# Patient Record
Sex: MALE | Race: WHITE | NOT HISPANIC OR LATINO | Employment: UNEMPLOYED | URBAN - METROPOLITAN AREA
[De-identification: names, ages, dates, MRNs, and addresses within clinical notes are randomized per-mention and may not be internally consistent; named-entity substitution may affect disease eponyms.]

---

## 2018-05-19 PROBLEM — F32.A DEPRESSION WITH SUICIDAL IDEATION: Status: ACTIVE | Noted: 2018-05-19

## 2018-05-19 PROBLEM — R45.851 DEPRESSION WITH SUICIDAL IDEATION: Status: ACTIVE | Noted: 2018-05-19

## 2018-05-20 PROBLEM — R00.1 BRADYCARDIA: Status: ACTIVE | Noted: 2018-05-20

## 2018-05-20 PROBLEM — F95.2 TOURETTE'S: Status: ACTIVE | Noted: 2018-05-20

## 2021-12-03 ENCOUNTER — HOSPITAL ENCOUNTER (EMERGENCY)
Facility: HOSPITAL | Age: 36
Discharge: HOME | End: 2021-12-03
Attending: FAMILY MEDICINE
Payer: MEDICAID

## 2021-12-03 VITALS
WEIGHT: 168 LBS | HEIGHT: 73 IN | DIASTOLIC BLOOD PRESSURE: 84 MMHG | SYSTOLIC BLOOD PRESSURE: 148 MMHG | HEART RATE: 89 BPM | TEMPERATURE: 99 F | RESPIRATION RATE: 18 BRPM | BODY MASS INDEX: 22.26 KG/M2 | OXYGEN SATURATION: 97 %

## 2021-12-03 DIAGNOSIS — Z76.0 ENCOUNTER FOR MEDICATION REFILL: Primary | ICD-10-CM

## 2021-12-03 PROCEDURE — 99281 EMR DPT VST MAYX REQ PHY/QHP: CPT

## 2021-12-03 RX ORDER — CYPROHEPTADINE HYDROCHLORIDE 4 MG/1
4 TABLET ORAL NIGHTLY
COMMUNITY
End: 2021-12-03 | Stop reason: SDUPTHER

## 2021-12-03 RX ORDER — ARIPIPRAZOLE 5 MG/1
5 TABLET ORAL 2 TIMES DAILY
COMMUNITY
End: 2021-12-03 | Stop reason: SDUPTHER

## 2021-12-03 RX ORDER — OXCARBAZEPINE 300 MG/1
150 TABLET, FILM COATED ORAL 2 TIMES DAILY
COMMUNITY
End: 2021-12-03 | Stop reason: SDUPTHER

## 2021-12-03 RX ORDER — PIMOZIDE 1 MG/1
1 TABLET ORAL NIGHTLY
Qty: 30 TABLET | Refills: 0 | Status: SHIPPED | OUTPATIENT
Start: 2021-12-03

## 2021-12-03 RX ORDER — OXCARBAZEPINE 300 MG/1
150 TABLET, FILM COATED ORAL 2 TIMES DAILY
Qty: 30 TABLET | Refills: 0 | Status: SHIPPED | OUTPATIENT
Start: 2021-12-03 | End: 2023-02-25

## 2021-12-03 RX ORDER — ARIPIPRAZOLE 5 MG/1
5 TABLET ORAL 2 TIMES DAILY
Qty: 60 TABLET | Refills: 0 | Status: SHIPPED | OUTPATIENT
Start: 2021-12-03 | End: 2023-02-25

## 2021-12-03 RX ORDER — PIMOZIDE 1 MG/1
1 TABLET ORAL NIGHTLY
COMMUNITY
End: 2021-12-03 | Stop reason: SDUPTHER

## 2021-12-03 RX ORDER — CYPROHEPTADINE HYDROCHLORIDE 4 MG/1
4 TABLET ORAL NIGHTLY
Qty: 30 TABLET | Refills: 0 | Status: SHIPPED | OUTPATIENT
Start: 2021-12-03 | End: 2022-01-02

## 2022-01-03 ENCOUNTER — LAB VISIT (OUTPATIENT)
Dept: PRIMARY CARE CLINIC | Facility: OTHER | Age: 37
End: 2022-01-03
Attending: INTERNAL MEDICINE
Payer: OTHER GOVERNMENT

## 2022-01-03 DIAGNOSIS — Z20.822 ENCOUNTER FOR LABORATORY TESTING FOR COVID-19 VIRUS: ICD-10-CM

## 2022-01-03 PROCEDURE — U0003 INFECTIOUS AGENT DETECTION BY NUCLEIC ACID (DNA OR RNA); SEVERE ACUTE RESPIRATORY SYNDROME CORONAVIRUS 2 (SARS-COV-2) (CORONAVIRUS DISEASE [COVID-19]), AMPLIFIED PROBE TECHNIQUE, MAKING USE OF HIGH THROUGHPUT TECHNOLOGIES AS DESCRIBED BY CMS-2020-01-R: HCPCS | Performed by: INTERNAL MEDICINE

## 2022-01-07 LAB — SARS-COV-2 RNA RESP QL NAA+PROBE: NOT DETECTED

## 2023-02-25 ENCOUNTER — HOSPITAL ENCOUNTER (EMERGENCY)
Facility: HOSPITAL | Age: 38
Discharge: HOME | End: 2023-02-25
Attending: EMERGENCY MEDICINE
Payer: MEDICAID

## 2023-02-25 VITALS
HEART RATE: 85 BPM | OXYGEN SATURATION: 99 % | BODY MASS INDEX: 24.52 KG/M2 | DIASTOLIC BLOOD PRESSURE: 85 MMHG | TEMPERATURE: 100 F | RESPIRATION RATE: 19 BRPM | HEIGHT: 73 IN | SYSTOLIC BLOOD PRESSURE: 139 MMHG | WEIGHT: 185 LBS

## 2023-02-25 DIAGNOSIS — R00.2 PALPITATIONS: Primary | ICD-10-CM

## 2023-02-25 DIAGNOSIS — R00.2 PALPITATION: ICD-10-CM

## 2023-02-25 PROCEDURE — 99283 EMERGENCY DEPT VISIT LOW MDM: CPT

## 2023-02-25 PROCEDURE — 93005 ELECTROCARDIOGRAM TRACING: CPT

## 2023-02-25 PROCEDURE — 63600175 PHARM REV CODE 636 W HCPCS: Performed by: EMERGENCY MEDICINE

## 2023-02-25 PROCEDURE — 96372 THER/PROPH/DIAG INJ SC/IM: CPT | Performed by: EMERGENCY MEDICINE

## 2023-02-25 RX ORDER — DIAZEPAM 2 MG/1
2 TABLET ORAL
Status: DISCONTINUED | OUTPATIENT
Start: 2023-02-25 | End: 2023-02-25

## 2023-02-25 RX ORDER — ZIPRASIDONE MESYLATE 20 MG/ML
10 INJECTION, POWDER, LYOPHILIZED, FOR SOLUTION INTRAMUSCULAR
Status: DISCONTINUED | OUTPATIENT
Start: 2023-02-25 | End: 2023-02-25 | Stop reason: HOSPADM

## 2023-02-25 RX ORDER — HYDROXYZINE PAMOATE 25 MG/1
25 CAPSULE ORAL EVERY 6 HOURS PRN
Qty: 16 CAPSULE | Refills: 0 | Status: SHIPPED | OUTPATIENT
Start: 2023-02-25

## 2023-02-25 RX ORDER — WATER FOR INJECTION,STERILE
VIAL (ML) INJECTION
Status: DISCONTINUED
Start: 2023-02-25 | End: 2023-02-25 | Stop reason: HOSPADM
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
## 2024-09-03 ENCOUNTER — HOSPITAL ENCOUNTER (EMERGENCY)
Facility: HOSPITAL | Age: 39
Discharge: HOME | End: 2024-09-03
Attending: STUDENT IN AN ORGANIZED HEALTH CARE EDUCATION/TRAINING PROGRAM
Payer: MEDICAID

## 2024-09-03 VITALS
DIASTOLIC BLOOD PRESSURE: 89 MMHG | HEART RATE: 122 BPM | WEIGHT: 145 LBS | TEMPERATURE: 98 F | SYSTOLIC BLOOD PRESSURE: 130 MMHG | BODY MASS INDEX: 18.61 KG/M2 | OXYGEN SATURATION: 96 % | RESPIRATION RATE: 18 BRPM | HEIGHT: 74 IN

## 2024-09-03 DIAGNOSIS — F10.920 ALCOHOLIC INTOXICATION WITHOUT COMPLICATION: Primary | ICD-10-CM

## 2024-09-03 PROCEDURE — 99281 EMR DPT VST MAYX REQ PHY/QHP: CPT
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
## 2024-09-04 NOTE — ED NOTES
Assumed care of pt from aron welch. Pt denies any si/hi. In no distress. Pt will be d/c'd and told him to call for a ride. Has no family or friends and called [facility] for a ride home. Spoke to officer karey who will call his sergeant and see if they will allow for a ride home

## 2025-01-04 ENCOUNTER — HOSPITAL ENCOUNTER (EMERGENCY)
Facility: HOSPITAL | Age: 40
Discharge: HOME | End: 2025-01-04
Attending: STUDENT IN AN ORGANIZED HEALTH CARE EDUCATION/TRAINING PROGRAM
Payer: MEDICARE

## 2025-01-04 VITALS
HEIGHT: 68 IN | TEMPERATURE: 100 F | WEIGHT: 140 LBS | HEART RATE: 112 BPM | SYSTOLIC BLOOD PRESSURE: 132 MMHG | BODY MASS INDEX: 21.22 KG/M2 | OXYGEN SATURATION: 95 % | DIASTOLIC BLOOD PRESSURE: 82 MMHG | RESPIRATION RATE: 20 BRPM

## 2025-01-04 DIAGNOSIS — Z00.8 MEDICAL CLEARANCE FOR INCARCERATION: Primary | ICD-10-CM

## 2025-01-04 PROCEDURE — 99281 EMR DPT VST MAYX REQ PHY/QHP: CPT

## 2025-01-04 RX ORDER — AMLODIPINE BESYLATE 5 MG/1
TABLET ORAL
COMMUNITY
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
## 2025-05-03 ENCOUNTER — HOSPITAL ENCOUNTER (EMERGENCY)
Facility: HOSPITAL | Age: 40
Discharge: HOME | End: 2025-05-03
Attending: STUDENT IN AN ORGANIZED HEALTH CARE EDUCATION/TRAINING PROGRAM
Payer: MEDICARE

## 2025-05-03 VITALS
WEIGHT: 148 LBS | HEART RATE: 86 BPM | DIASTOLIC BLOOD PRESSURE: 85 MMHG | OXYGEN SATURATION: 99 % | RESPIRATION RATE: 20 BRPM | HEIGHT: 68 IN | BODY MASS INDEX: 22.43 KG/M2 | TEMPERATURE: 99 F | SYSTOLIC BLOOD PRESSURE: 124 MMHG

## 2025-05-03 DIAGNOSIS — Z13.9 ENCOUNTER FOR MEDICAL SCREENING EXAMINATION: Primary | ICD-10-CM

## 2025-05-03 PROCEDURE — 99282 EMERGENCY DEPT VISIT SF MDM: CPT

## 2025-05-03 RX ORDER — LOSARTAN POTASSIUM 50 MG/1
50 TABLET ORAL
COMMUNITY
Start: 2025-02-20

## 2025-05-04 NOTE — ED NOTES
Pt reports denies si and hi and he is here volentary wants to go to [place] because he is from there.

## 2025-05-04 NOTE — ED PROVIDER NOTES
"Encounter Date: 5/3/2025       History     Chief Complaint   Patient presents with    MEDICAL EVALUATION     Pt arrived to ED wanting to be evaluated by a doctor would and not state specific reason except that he wanted to go to [place]. Denies SI and HI. Denies pain. Admitted to drinking beer and whiskey today. AAOx4. See Dr. Camp's note.     Patient is a 39-year-old white gentleman with a history of Tourette syndrome, TBI self-reported who presented to the ER today voluntarily to "go to [place]. "And states he did smoke marijuana, smokes cigarettes and drank beer today.  It appears per chart review patient often times comes in after drinking alcohol.  When asked why he wants to go back to [place] he states that "I was born there. " he states he lives in [place] now.  Initially was presumed that patient was coming in for psych evaluation however patient states "I am not suicidal, homicidal or hearing any voices."He denies any complaints.  He states he is hungry.  He denies any fevers, chest pain, shortness of breath or abdominal pain.      Review of patient's allergies indicates:   Allergen Reactions    Pcn [penicillins]      Past Medical History:   Diagnosis Date    Depression     Mild intellectual disability     Tourette's     Tourettes syndrome      History reviewed. No pertinent surgical history.  No family history on file.  Social History[1]  Review of Systems   Constitutional:  Negative for chills, fatigue and fever.   HENT:  Negative for congestion, sore throat and trouble swallowing.    Eyes:  Negative for pain and visual disturbance.   Respiratory:  Negative for cough, shortness of breath and wheezing.    Cardiovascular:  Negative for chest pain and palpitations.   Gastrointestinal:  Negative for abdominal pain, blood in stool, constipation, diarrhea, nausea and vomiting.   Genitourinary:  Negative for dysuria and hematuria.   Musculoskeletal:  Negative for back pain and myalgias.   Skin:  " Negative for rash and wound.   Neurological:  Negative for seizures, syncope and headaches.   Psychiatric/Behavioral:  Negative for confusion. The patient is not nervous/anxious.        Physical Exam     Initial Vitals [05/03/25 2045]   BP Pulse Resp Temp SpO2   (!) 128/92 82 18 98.5 °F (36.9 °C) 99 %      MAP       --         Physical Exam    Nursing note and vitals reviewed.  Constitutional: He appears well-developed and well-nourished. No distress.   HENT:   Head: Normocephalic and atraumatic.   Eyes: Conjunctivae and EOM are normal. Right eye exhibits no discharge. Left eye exhibits no discharge. No scleral icterus.   Neck: No tracheal deviation present.   Normal range of motion.  Cardiovascular:  Normal rate, regular rhythm and normal heart sounds.     Exam reveals no gallop and no friction rub.       No murmur heard.  Pulmonary/Chest: Breath sounds normal. No respiratory distress. He has no wheezes. He has no rhonchi. He has no rales.   Abdominal: Abdomen is soft. He exhibits no distension. There is no abdominal tenderness. There is no rebound and no guarding.   Musculoskeletal:         General: No edema. Normal range of motion.      Cervical back: Normal range of motion.     Neurological: He is alert.   Skin: Skin is warm and dry. No rash and no abscess noted. No erythema. No pallor.   Psychiatric: He has a normal mood and affect. His speech is normal and behavior is normal. Judgment and thought content normal. He is not actively hallucinating. Thought content is not paranoid. He expresses no homicidal and no suicidal ideation. He is attentive.         ED Course   Procedures  Labs Reviewed - No data to display       Imaging Results    None          Medications - No data to display  Medical Decision Making  Differentials:  Psych evaluation, malingering, secondary gain, suicidal ideation   History is provided by the patient   39-year-old well-appearing male presents to the ER today for vague complaints. It  seems his main complaint is that he is hungry.  He denies all criteria for PEC.  Pt was provided with a meal and showed no signs of being clinically intoxicated.  Pt monitored for 3+ hours and again denies any complaints for PEC.  ER return precautions discussed, close follow up with the PCP was recommended.                                      Clinical Impression:  Final diagnoses:  [Z13.9] Encounter for medical screening examination (Primary)          ED Disposition Condition    Discharge Stable          ED Prescriptions    None       Follow-up Information       Follow up With Specialties Details Why Contact Info    [facility] Emergency [facility]  If symptoms worsen [address] [place] [id number]  [phone]                 [1]   Social History  Tobacco Use    Smoking status: Every Day     Current packs/day: 2.00     Types: Cigarettes    Smokeless tobacco: Never   Substance Use Topics    Alcohol use: Yes     Comment: daily    Drug use: Yes     Types: Marijuana     Comment: Yuri Sharma MD  05/03/25 8952

## 2025-08-02 ENCOUNTER — HOSPITAL ENCOUNTER (EMERGENCY)
Facility: HOSPITAL | Age: 40
Discharge: PSYCHIATRIC OR SUBSTANCE USE FACILITY | End: 2025-08-02
Attending: GENERAL PRACTICE
Payer: MEDICARE

## 2025-08-02 ENCOUNTER — HOSPITAL ENCOUNTER (INPATIENT)
Facility: HOSPITAL | Age: 40
LOS: 9 days | Discharge: HOME | DRG: 881 | End: 2025-08-11
Attending: PSYCHIATRY & NEUROLOGY | Admitting: PSYCHIATRY & NEUROLOGY
Payer: MEDICARE

## 2025-08-02 VITALS
TEMPERATURE: 97 F | WEIGHT: 145 LBS | RESPIRATION RATE: 17 BRPM | BODY MASS INDEX: 19.22 KG/M2 | DIASTOLIC BLOOD PRESSURE: 64 MMHG | OXYGEN SATURATION: 97 % | SYSTOLIC BLOOD PRESSURE: 113 MMHG | HEART RATE: 80 BPM | HEIGHT: 73 IN

## 2025-08-02 DIAGNOSIS — F32.A DEPRESSION WITH SUICIDAL IDEATION: ICD-10-CM

## 2025-08-02 DIAGNOSIS — F10.10 ALCOHOL ABUSE: ICD-10-CM

## 2025-08-02 DIAGNOSIS — R45.851 SUICIDAL IDEATION: Primary | ICD-10-CM

## 2025-08-02 DIAGNOSIS — R45.851 DEPRESSION WITH SUICIDAL IDEATION: ICD-10-CM

## 2025-08-02 LAB
ACCEPTIBLE SP GR UR QL: >=1.03 (ref 1–1.03)
ALBUMIN SERPL-MCNC: 4.1 G/DL (ref 3.5–5)
ALBUMIN/GLOB SERPL: 1.3 RATIO (ref 1.1–2)
ALP SERPL-CCNC: 62 UNIT/L (ref 40–150)
ALT SERPL-CCNC: 15 UNIT/L (ref 0–55)
AMPHET UR QL SCN: NEGATIVE
ANION GAP SERPL CALC-SCNC: 14 MEQ/L
APAP SERPL-MCNC: <3 UG/ML (ref 10–30)
AST SERPL-CCNC: 21 UNIT/L (ref 11–45)
BACTERIA #/AREA URNS AUTO: ABNORMAL /HPF
BARBITURATE SCN PRESENT UR: NEGATIVE
BASOPHILS # BLD AUTO: 0.08 X10(3)/MCL
BASOPHILS NFR BLD AUTO: 1 %
BENZODIAZ UR QL SCN: NEGATIVE
BILIRUB SERPL-MCNC: 0.7 MG/DL
BILIRUB UR QL STRIP.AUTO: ABNORMAL
BUN SERPL-MCNC: 9 MG/DL (ref 8.9–20.6)
CALCIUM SERPL-MCNC: 8.5 MG/DL (ref 8.4–10.2)
CANNABINOIDS UR QL SCN: NEGATIVE
CHLORIDE SERPL-SCNC: 108 MMOL/L (ref 98–107)
CLARITY UR: CLEAR
CO2 SERPL-SCNC: 19 MMOL/L (ref 22–29)
COCAINE UR QL SCN: NEGATIVE
COLOR UR AUTO: YELLOW
CREAT SERPL-MCNC: 0.77 MG/DL (ref 0.72–1.25)
CREAT/UREA NIT SERPL: 12
EOSINOPHIL # BLD AUTO: 0.13 X10(3)/MCL (ref 0–0.9)
EOSINOPHIL NFR BLD AUTO: 1.5 %
ERYTHROCYTE [DISTWIDTH] IN BLOOD BY AUTOMATED COUNT: 13.2 % (ref 11.5–17)
ETHANOL SERPL-MCNC: 209 MG/DL
FENTANYL UR QL SCN: NEGATIVE
GFR SERPLBLD CREATININE-BSD FMLA CKD-EPI: >60 ML/MIN/1.73/M2
GLOBULIN SER-MCNC: 3.1 GM/DL (ref 2.4–3.5)
GLUCOSE SERPL-MCNC: 81 MG/DL (ref 74–100)
GLUCOSE UR QL STRIP: NEGATIVE
GRAN CASTS URNS QL MICRO: ABNORMAL /LPF
HCT VFR BLD AUTO: 46.1 % (ref 42–52)
HGB BLD-MCNC: 16.2 G/DL (ref 14–18)
HGB UR QL STRIP: ABNORMAL
IMM GRANULOCYTES # BLD AUTO: 0.02 X10(3)/MCL (ref 0–0.04)
IMM GRANULOCYTES NFR BLD AUTO: 0.2 %
KETONES UR QL STRIP: ABNORMAL
LEUKOCYTE ESTERASE UR QL STRIP: NEGATIVE
LYMPHOCYTES # BLD AUTO: 2.48 X10(3)/MCL (ref 0.6–4.6)
LYMPHOCYTES NFR BLD AUTO: 29.5 %
MCH RBC QN AUTO: 30.9 PG (ref 27–31)
MCHC RBC AUTO-ENTMCNC: 35.1 G/DL (ref 33–36)
MCV RBC AUTO: 87.8 FL (ref 80–94)
MDMA UR QL SCN: NEGATIVE
MONOCYTES # BLD AUTO: 0.56 X10(3)/MCL (ref 0.1–1.3)
MONOCYTES NFR BLD AUTO: 6.7 %
MUCOUS THREADS URNS QL MICRO: ABNORMAL /LPF
NEUTROPHILS # BLD AUTO: 5.15 X10(3)/MCL (ref 2.1–9.2)
NEUTROPHILS NFR BLD AUTO: 61.1 %
NITRITE UR QL STRIP: NEGATIVE
NRBC BLD AUTO-RTO: 0 %
OPIATES UR QL SCN: NEGATIVE
PCP UR QL: NEGATIVE
PH UR STRIP: 6 [PH]
PH UR: 6 [PH] (ref 3–11)
PLATELET # BLD AUTO: 252 X10(3)/MCL (ref 130–400)
PMV BLD AUTO: 9.6 FL (ref 7.4–10.4)
POTASSIUM SERPL-SCNC: 4.1 MMOL/L (ref 3.5–5.1)
PROT SERPL-MCNC: 7.2 GM/DL (ref 6.4–8.3)
PROT UR QL STRIP: ABNORMAL
RBC # BLD AUTO: 5.25 X10(6)/MCL (ref 4.7–6.1)
RBC #/AREA URNS AUTO: ABNORMAL /HPF
SALICYLATES SERPL-MCNC: <5 MG/DL (ref 15–30)
SODIUM SERPL-SCNC: 141 MMOL/L (ref 136–145)
SP GR UR STRIP.AUTO: >=1.03 (ref 1–1.03)
SQUAMOUS #/AREA URNS AUTO: ABNORMAL /HPF
TSH SERPL-ACNC: 0.36 UIU/ML (ref 0.35–4.94)
UROBILINOGEN UR STRIP-ACNC: 0.2
WBC # BLD AUTO: 8.42 X10(3)/MCL (ref 4.5–11.5)
WBC #/AREA URNS AUTO: ABNORMAL /HPF

## 2025-08-02 PROCEDURE — 80053 COMPREHEN METABOLIC PANEL: CPT | Performed by: GENERAL PRACTICE

## 2025-08-02 PROCEDURE — 81003 URINALYSIS AUTO W/O SCOPE: CPT | Performed by: GENERAL PRACTICE

## 2025-08-02 PROCEDURE — 80179 DRUG ASSAY SALICYLATE: CPT | Performed by: GENERAL PRACTICE

## 2025-08-02 PROCEDURE — 11400000 HC PSYCH PRIVATE ROOM

## 2025-08-02 PROCEDURE — 80143 DRUG ASSAY ACETAMINOPHEN: CPT | Performed by: GENERAL PRACTICE

## 2025-08-02 PROCEDURE — 25000003 PHARM REV CODE 250: Performed by: GENERAL PRACTICE

## 2025-08-02 PROCEDURE — 25000003 PHARM REV CODE 250: Performed by: PSYCHIATRY & NEUROLOGY

## 2025-08-02 PROCEDURE — 82077 ASSAY SPEC XCP UR&BREATH IA: CPT | Performed by: GENERAL PRACTICE

## 2025-08-02 PROCEDURE — 84443 ASSAY THYROID STIM HORMONE: CPT | Performed by: GENERAL PRACTICE

## 2025-08-02 PROCEDURE — 99285 EMERGENCY DEPT VISIT HI MDM: CPT

## 2025-08-02 PROCEDURE — 80307 DRUG TEST PRSMV CHEM ANLYZR: CPT | Performed by: GENERAL PRACTICE

## 2025-08-02 PROCEDURE — 85025 COMPLETE CBC W/AUTO DIFF WBC: CPT | Performed by: GENERAL PRACTICE

## 2025-08-02 RX ORDER — DIPHENHYDRAMINE HCL 25 MG
50 CAPSULE ORAL EVERY 4 HOURS PRN
Status: DISCONTINUED | OUTPATIENT
Start: 2025-08-02 | End: 2025-08-02 | Stop reason: HOSPADM

## 2025-08-02 RX ORDER — OLANZAPINE 10 MG/1
10 TABLET, ORALLY DISINTEGRATING ORAL EVERY 8 HOURS PRN
Status: DISCONTINUED | OUTPATIENT
Start: 2025-08-02 | End: 2025-08-11

## 2025-08-02 RX ORDER — IBUPROFEN 400 MG/1
400 TABLET, FILM COATED ORAL EVERY 6 HOURS PRN
Status: DISCONTINUED | OUTPATIENT
Start: 2025-08-02 | End: 2025-08-11 | Stop reason: HOSPADM

## 2025-08-02 RX ORDER — LORAZEPAM 1 MG/1
1 TABLET ORAL 4 TIMES DAILY
Status: DISCONTINUED | OUTPATIENT
Start: 2025-08-02 | End: 2025-08-05

## 2025-08-02 RX ORDER — OLANZAPINE 5 MG/1
10 TABLET, ORALLY DISINTEGRATING ORAL EVERY 8 HOURS PRN
Status: DISCONTINUED | OUTPATIENT
Start: 2025-08-02 | End: 2025-08-02 | Stop reason: HOSPADM

## 2025-08-02 RX ORDER — ACETAMINOPHEN 325 MG/1
650 TABLET ORAL EVERY 6 HOURS PRN
Status: DISCONTINUED | OUTPATIENT
Start: 2025-08-02 | End: 2025-08-04

## 2025-08-02 RX ORDER — ALUMINUM HYDROXIDE, MAGNESIUM HYDROXIDE, AND SIMETHICONE 1200; 120; 1200 MG/30ML; MG/30ML; MG/30ML
30 SUSPENSION ORAL EVERY 6 HOURS PRN
Status: DISCONTINUED | OUTPATIENT
Start: 2025-08-02 | End: 2025-08-11 | Stop reason: HOSPADM

## 2025-08-02 RX ORDER — HYDROXYZINE PAMOATE 25 MG/1
50 CAPSULE ORAL EVERY 6 HOURS PRN
Status: DISCONTINUED | OUTPATIENT
Start: 2025-08-02 | End: 2025-08-11

## 2025-08-02 RX ADMIN — DIPHENHYDRAMINE HYDROCHLORIDE 50 MG: 25 CAPSULE ORAL at 12:08

## 2025-08-02 RX ADMIN — LORAZEPAM 1 MG: 1 TABLET ORAL at 04:08

## 2025-08-02 RX ADMIN — LORAZEPAM 1 MG: 1 TABLET ORAL at 08:08

## 2025-08-02 RX ADMIN — OLANZAPINE 10 MG: 5 TABLET, ORALLY DISINTEGRATING ORAL at 12:08

## 2025-08-02 NOTE — ED PROVIDER NOTES
Encounter Date: 8/2/2025       History     Chief Complaint   Patient presents with    Psychiatric Evaluation     Patient brought into the ER by police who responded to call from pt's neighbor that he was outside yelling.  Patient told the police he wanted them to shoot him.  Pt states that he did say that, but does not want to die or harm others.  Patient manic with tangential speaking.       HPI  Review of patient's allergies indicates:   Allergen Reactions    Pcn [penicillins]      Past Medical History:   Diagnosis Date    Depression     Mild intellectual disability     Tourette's     Tourettes syndrome      History reviewed. No pertinent surgical history.  No family history on file.  Social History[1]  Review of Systems    Physical Exam     Initial Vitals [08/02/25 1223]   BP Pulse Resp Temp SpO2   -- (!) 119 20 97.9 °F (36.6 °C) 97 %      MAP       --         Physical Exam    Nursing note and vitals reviewed.  Constitutional: He appears well-developed and well-nourished.   HENT:   Head: Normocephalic and atraumatic.   Eyes: EOM are normal. Pupils are equal, round, and reactive to light.   Neck: Neck supple.   Normal range of motion.  Cardiovascular:  Normal rate, regular rhythm, normal heart sounds and intact distal pulses.           Pulmonary/Chest: Breath sounds normal.   Abdominal: Abdomen is soft. Bowel sounds are normal.   Musculoskeletal:         General: Normal range of motion.      Cervical back: Normal range of motion and neck supple.     Neurological: He is alert and oriented to person, place, and time. He has normal strength.   Skin: Skin is warm and dry.   Psychiatric: His mood appears anxious. His affect is angry and inappropriate. His speech is rapid and/or pressured and tangential. He is agitated, aggressive and hyperactive. Thought content is paranoid. He expresses impulsivity and inappropriate judgment. He expresses suicidal ideation. He expresses suicidal plans. He exhibits abnormal recent  memory.         ED Course   Procedures  Labs Reviewed   COMPREHENSIVE METABOLIC PANEL - Abnormal       Result Value    Sodium 141      Potassium 4.1      Chloride 108 (*)     CO2 19 (*)     Glucose 81      Blood Urea Nitrogen 9.0      Creatinine 0.77      Calcium 8.5      Protein Total 7.2      Albumin 4.1      Globulin 3.1      Albumin/Globulin Ratio 1.3      Bilirubin Total 0.7      ALP 62      ALT 15      AST 21      eGFR >60      Anion Gap 14.0      BUN/Creatinine Ratio 12     URINALYSIS, REFLEX TO URINE CULTURE - Abnormal    Color, UA Yellow      Appearance, UA Clear      Specific Gravity, UA >=1.030      pH, UA 6.0      Protein, UA 1+ (*)     Glucose, UA Negative      Ketones, UA Trace (*)     Blood, UA 1+ (*)     Bilirubin, UA 1+ (*)     Urobilinogen, UA 0.2      Nitrites, UA Negative      Leukocyte Esterase, UA Negative     ALCOHOL,MEDICAL (ETHANOL) - Abnormal    Ethanol Level 209.0 (*)    ACETAMINOPHEN LEVEL - Abnormal    Acetaminophen Level <3.0 (*)    SALICYLATE LEVEL - Abnormal    Salicylate Level <5.0 (*)    URINALYSIS, MICROSCOPIC - Abnormal    Bacteria, UA Few (*)     Mucous, UA Moderate (*)     Granular Casts, UA Few (*)     RBC, UA 0-2      WBC, UA 0-2      Squamous Epithelial Cells, UA Rare     DRUG SCREEN, URINE (BEAKER) - Normal    Amphetamines, Urine Negative      Barbiturates, Urine Negative      Benzodiazepine, Urine Negative      Cannabinoids, Urine Negative      Cocaine, Urine Negative      Fentanyl, Urine Negative      MDMA, Urine Negative      Opiates, Urine Negative      Phencyclidine, Urine Negative      pH, Urine 6.0      Specific Gravity, Urine Auto >=1.030      Narrative:     Cut off concentrations:    Amphetamines - 1000 ng/ml  Barbiturates - 200 ng/ml  Benzodiazepine - 200 ng/ml  Cannabinoids (THC) - 50 ng/ml  Cocaine - 300 ng/ml  Fentanyl - 1.0 ng/ml  MDMA - 500 ng/ml  Opiates - 300 ng/ml   Phencyclidine (PCP) - 25 ng/ml    Specimen submitted for drug analysis and tested for pH and  specific gravity in order to evaluate sample integrity. Suspect tampering if specific gravity is <1.003 and/or pH is not within the range of 4.5 - 8.0  False negatives may result form substances such as bleach added to urine.  False positives may result for the presence of a substance with similar chemical structure to the drug or its metabolite.    This test provides only a PRELIMINARY analytical test result. A more specific alternate chemical method must be used in order to obtain a confirmed analytical result. Gas chromatography/mass spectrometry (GC/MS) is the preferred confirmatory method. Other chemical confirmation methods are available. Clinical consideration and professional judgement should be applied to any drug of abuse test result, particularly when preliminary positive results are used.    Positive results will be confirmed only at the physicians request. Unconfirmed screening results are to be used only for medical purposes (treatment).        TSH - Normal    TSH 0.359     CBC W/ AUTO DIFFERENTIAL    Narrative:     The following orders were created for panel order CBC auto differential.  Procedure                               Abnormality         Status                     ---------                               -----------         ------                     CBC with Differential[[id number]]                            Final result                 Please view results for these tests on the individual orders.   CBC WITH DIFFERENTIAL    WBC 8.42      RBC 5.25      Hgb 16.2      Hct 46.1      MCV 87.8      MCH 30.9      MCHC 35.1      RDW 13.2      Platelet 252      MPV 9.6      Neut % 61.1      Lymph % 29.5      Mono % 6.7      Eos % 1.5      Basophil % 1.0      Imm Grans % 0.2      Neut # 5.15      Lymph # 2.48      Mono # 0.56      Eos # 0.13      Baso # 0.08      Imm Gran # 0.02      NRBC% 0.0            Imaging Results    None          Medications   OLANZapine zydis disintegrating tablet 10 mg (10 mg  Oral Given 8/2/25 1230)   diphenhydrAMINE capsule 50 mg (50 mg Oral Given 8/2/25 1230)     Medical Decision Making  Labs revealed that the patient has an elevated alcohol.  He is currently resting comfortably and we will be PEC'd due to manic behavior that is unpredictable and possibly dangerous.  While he denies suicidal ideations at the present, he did ask the  to shoot him indicating there might be suicidal potential.  As such we will PCM and have him evaluated the psychiatric facility.    Amount and/or Complexity of Data Reviewed  Labs: ordered.    Risk  OTC drugs.  Prescription drug management.                  Medically cleared for psychiatry placement: 8/2/2025  1:41 PM                       Clinical Impression:  Final diagnoses:  [R45.851] Suicidal ideation (Primary)  [F10.10] Alcohol abuse          ED Disposition Condition    Transfer to Psych Facility Stable          ED Prescriptions    None       Follow-up Information    None                [1]   Social History  Tobacco Use    Smoking status: Every Day     Current packs/day: 2.00     Types: Cigarettes, Cigars    Smokeless tobacco: Never   Substance Use Topics    Alcohol use: Yes     Comment: daily    Drug use: Not Currently     Types: Marijuana     Comment: Lawson Vargas MD  08/02/25 8222

## 2025-08-02 NOTE — ED NOTES
Pt PEC'd at 1218 by Dr Magallanes, security notified; initial safety checks performed with [facility]; Pt belongings obtained; shirt, pants, belt, hat, slider shoes, $2, pack of cigarettes secured; pt changed into facility appropriate scrubs

## 2025-08-02 NOTE — ED NOTES
Report to [facility]; security notified to escort pt to [facility] w/ RN via WC

## 2025-08-03 PROBLEM — R45.851 SUICIDAL IDEATION: Status: RESOLVED | Noted: 2025-08-03 | Resolved: 2025-08-03

## 2025-08-03 PROBLEM — R45.851 SUICIDAL IDEATION: Status: ACTIVE | Noted: 2025-08-03

## 2025-08-03 LAB
CHOLEST SERPL-MCNC: 121 MG/DL
CHOLEST/HDLC SERPL: 3 {RATIO} (ref 0–5)
EST. AVERAGE GLUCOSE BLD GHB EST-MCNC: 93.9 MG/DL
GLUCOSE P FAST SERPL-MCNC: 93 MG/DL (ref 70–100)
HBA1C MFR BLD: 4.9 %
HDLC SERPL-MCNC: 45 MG/DL (ref 35–60)
LDLC SERPL CALC-MCNC: 68 MG/DL (ref 50–140)
T PALLIDUM AB SER QL: NONREACTIVE
T3FREE SERPL-MCNC: 2.14 PG/ML (ref 1.58–3.91)
T4 FREE SERPL-MCNC: 0.86 NG/DL (ref 0.7–1.48)
TRIGL SERPL-MCNC: 41 MG/DL (ref 34–140)
TSH SERPL-ACNC: 0.4 UIU/ML (ref 0.35–4.94)
VLDLC SERPL CALC-MCNC: 8 MG/DL

## 2025-08-03 PROCEDURE — 80061 LIPID PANEL: CPT | Performed by: PSYCHIATRY & NEUROLOGY

## 2025-08-03 PROCEDURE — 84443 ASSAY THYROID STIM HORMONE: CPT | Performed by: PSYCHIATRY & NEUROLOGY

## 2025-08-03 PROCEDURE — 25000003 PHARM REV CODE 250: Performed by: PSYCHIATRY & NEUROLOGY

## 2025-08-03 PROCEDURE — 11400000 HC PSYCH PRIVATE ROOM

## 2025-08-03 PROCEDURE — 86780 TREPONEMA PALLIDUM: CPT | Performed by: PSYCHIATRY & NEUROLOGY

## 2025-08-03 PROCEDURE — 84481 FREE ASSAY (FT-3): CPT | Performed by: PSYCHIATRY & NEUROLOGY

## 2025-08-03 PROCEDURE — 84439 ASSAY OF FREE THYROXINE: CPT | Performed by: PSYCHIATRY & NEUROLOGY

## 2025-08-03 PROCEDURE — 83036 HEMOGLOBIN GLYCOSYLATED A1C: CPT | Performed by: PSYCHIATRY & NEUROLOGY

## 2025-08-03 PROCEDURE — 82947 ASSAY GLUCOSE BLOOD QUANT: CPT | Performed by: PSYCHIATRY & NEUROLOGY

## 2025-08-03 PROCEDURE — 36415 COLL VENOUS BLD VENIPUNCTURE: CPT | Performed by: PSYCHIATRY & NEUROLOGY

## 2025-08-03 RX ORDER — SERTRALINE HYDROCHLORIDE 50 MG/1
50 TABLET, FILM COATED ORAL DAILY
Status: DISCONTINUED | OUTPATIENT
Start: 2025-08-03 | End: 2025-08-11 | Stop reason: HOSPADM

## 2025-08-03 RX ORDER — OLANZAPINE 5 MG/1
5 TABLET, FILM COATED ORAL NIGHTLY
Status: DISCONTINUED | OUTPATIENT
Start: 2025-08-03 | End: 2025-08-07

## 2025-08-03 RX ADMIN — OLANZAPINE 5 MG: 5 TABLET, FILM COATED ORAL at 08:08

## 2025-08-03 RX ADMIN — LORAZEPAM 1 MG: 1 TABLET ORAL at 04:08

## 2025-08-03 RX ADMIN — LORAZEPAM 1 MG: 1 TABLET ORAL at 12:08

## 2025-08-03 RX ADMIN — LORAZEPAM 1 MG: 1 TABLET ORAL at 08:08

## 2025-08-03 RX ADMIN — SERTRALINE HYDROCHLORIDE 50 MG: 50 TABLET ORAL at 10:08

## 2025-08-04 PROBLEM — F10.90 ALCOHOL USE DISORDER: Status: ACTIVE | Noted: 2025-08-04

## 2025-08-04 PROCEDURE — 25000003 PHARM REV CODE 250: Performed by: PSYCHIATRY & NEUROLOGY

## 2025-08-04 PROCEDURE — S4991 NICOTINE PATCH NONLEGEND: HCPCS | Performed by: PSYCHIATRY & NEUROLOGY

## 2025-08-04 PROCEDURE — 11400000 HC PSYCH PRIVATE ROOM

## 2025-08-04 RX ORDER — IBUPROFEN 200 MG
1 TABLET ORAL DAILY
Status: DISCONTINUED | OUTPATIENT
Start: 2025-08-04 | End: 2025-08-10

## 2025-08-04 RX ORDER — ACETAMINOPHEN 325 MG/1
650 TABLET ORAL EVERY 6 HOURS PRN
Status: DISCONTINUED | OUTPATIENT
Start: 2025-08-04 | End: 2025-08-11 | Stop reason: HOSPADM

## 2025-08-04 RX ADMIN — LORAZEPAM 1 MG: 1 TABLET ORAL at 08:08

## 2025-08-04 RX ADMIN — SERTRALINE HYDROCHLORIDE 50 MG: 50 TABLET ORAL at 09:08

## 2025-08-04 RX ADMIN — NICOTINE 1 PATCH: 21 PATCH, EXTENDED RELEASE TRANSDERMAL at 12:08

## 2025-08-04 RX ADMIN — OLANZAPINE 5 MG: 5 TABLET, FILM COATED ORAL at 08:08

## 2025-08-04 RX ADMIN — LORAZEPAM 1 MG: 1 TABLET ORAL at 12:08

## 2025-08-04 RX ADMIN — LORAZEPAM 1 MG: 1 TABLET ORAL at 04:08

## 2025-08-05 PROCEDURE — 11400000 HC PSYCH PRIVATE ROOM

## 2025-08-05 PROCEDURE — 25000003 PHARM REV CODE 250: Performed by: PSYCHIATRY & NEUROLOGY

## 2025-08-05 PROCEDURE — S4991 NICOTINE PATCH NONLEGEND: HCPCS | Performed by: PSYCHIATRY & NEUROLOGY

## 2025-08-05 RX ORDER — LORAZEPAM 0.5 MG/1
0.5 TABLET ORAL 4 TIMES DAILY
Status: DISCONTINUED | OUTPATIENT
Start: 2025-08-05 | End: 2025-08-07

## 2025-08-05 RX ADMIN — SERTRALINE HYDROCHLORIDE 50 MG: 50 TABLET ORAL at 08:08

## 2025-08-05 RX ADMIN — LORAZEPAM 0.5 MG: 0.5 TABLET ORAL at 04:08

## 2025-08-05 RX ADMIN — NICOTINE 1 PATCH: 21 PATCH, EXTENDED RELEASE TRANSDERMAL at 08:08

## 2025-08-05 RX ADMIN — LORAZEPAM 0.5 MG: 0.5 TABLET ORAL at 08:08

## 2025-08-05 RX ADMIN — LORAZEPAM 0.5 MG: 0.5 TABLET ORAL at 12:08

## 2025-08-05 RX ADMIN — LORAZEPAM 1 MG: 1 TABLET ORAL at 08:08

## 2025-08-05 RX ADMIN — OLANZAPINE 5 MG: 5 TABLET, FILM COATED ORAL at 08:08

## 2025-08-06 PROCEDURE — S4991 NICOTINE PATCH NONLEGEND: HCPCS | Performed by: PSYCHIATRY & NEUROLOGY

## 2025-08-06 PROCEDURE — 25000003 PHARM REV CODE 250: Performed by: PSYCHIATRY & NEUROLOGY

## 2025-08-06 PROCEDURE — 11400000 HC PSYCH PRIVATE ROOM

## 2025-08-06 RX ADMIN — OLANZAPINE 5 MG: 5 TABLET, FILM COATED ORAL at 08:08

## 2025-08-06 RX ADMIN — SERTRALINE HYDROCHLORIDE 50 MG: 50 TABLET ORAL at 08:08

## 2025-08-06 RX ADMIN — LORAZEPAM 0.5 MG: 0.5 TABLET ORAL at 04:08

## 2025-08-06 RX ADMIN — LORAZEPAM 0.5 MG: 0.5 TABLET ORAL at 08:08

## 2025-08-06 RX ADMIN — LORAZEPAM 0.5 MG: 0.5 TABLET ORAL at 12:08

## 2025-08-06 RX ADMIN — NICOTINE 1 PATCH: 21 PATCH, EXTENDED RELEASE TRANSDERMAL at 08:08

## 2025-08-07 PROCEDURE — 25000003 PHARM REV CODE 250: Performed by: PSYCHIATRY & NEUROLOGY

## 2025-08-07 PROCEDURE — 11400000 HC PSYCH PRIVATE ROOM

## 2025-08-07 PROCEDURE — S4991 NICOTINE PATCH NONLEGEND: HCPCS | Performed by: PSYCHIATRY & NEUROLOGY

## 2025-08-07 RX ORDER — LORAZEPAM 0.5 MG/1
0.5 TABLET ORAL 3 TIMES DAILY
Status: DISCONTINUED | OUTPATIENT
Start: 2025-08-07 | End: 2025-08-08

## 2025-08-07 RX ORDER — OLANZAPINE 10 MG/1
10 TABLET, FILM COATED ORAL NIGHTLY
Status: DISCONTINUED | OUTPATIENT
Start: 2025-08-07 | End: 2025-08-11 | Stop reason: HOSPADM

## 2025-08-07 RX ADMIN — LORAZEPAM 0.5 MG: 0.5 TABLET ORAL at 08:08

## 2025-08-07 RX ADMIN — OLANZAPINE 10 MG: 10 TABLET, FILM COATED ORAL at 08:08

## 2025-08-07 RX ADMIN — OLANZAPINE 5 MG: 5 TABLET, FILM COATED ORAL at 08:08

## 2025-08-07 RX ADMIN — LORAZEPAM 0.5 MG: 0.5 TABLET ORAL at 05:08

## 2025-08-07 RX ADMIN — SERTRALINE HYDROCHLORIDE 50 MG: 50 TABLET ORAL at 08:08

## 2025-08-07 RX ADMIN — LORAZEPAM 0.5 MG: 0.5 TABLET ORAL at 01:08

## 2025-08-07 RX ADMIN — NICOTINE 1 PATCH: 21 PATCH, EXTENDED RELEASE TRANSDERMAL at 08:08

## 2025-08-08 PROCEDURE — 11400000 HC PSYCH PRIVATE ROOM

## 2025-08-08 PROCEDURE — 25000003 PHARM REV CODE 250: Performed by: PSYCHIATRY & NEUROLOGY

## 2025-08-08 PROCEDURE — S4991 NICOTINE PATCH NONLEGEND: HCPCS | Performed by: PSYCHIATRY & NEUROLOGY

## 2025-08-08 RX ORDER — LORAZEPAM 0.5 MG/1
0.5 TABLET ORAL 2 TIMES DAILY
Status: DISCONTINUED | OUTPATIENT
Start: 2025-08-08 | End: 2025-08-10

## 2025-08-08 RX ADMIN — SERTRALINE HYDROCHLORIDE 50 MG: 50 TABLET ORAL at 08:08

## 2025-08-08 RX ADMIN — OLANZAPINE 10 MG: 10 TABLET, FILM COATED ORAL at 08:08

## 2025-08-08 RX ADMIN — NICOTINE 1 PATCH: 21 PATCH, EXTENDED RELEASE TRANSDERMAL at 08:08

## 2025-08-08 RX ADMIN — LORAZEPAM 0.5 MG: 0.5 TABLET ORAL at 08:08

## 2025-08-09 PROCEDURE — 25000003 PHARM REV CODE 250: Performed by: PSYCHIATRY & NEUROLOGY

## 2025-08-09 PROCEDURE — 11400000 HC PSYCH PRIVATE ROOM

## 2025-08-09 PROCEDURE — S4991 NICOTINE PATCH NONLEGEND: HCPCS | Performed by: PSYCHIATRY & NEUROLOGY

## 2025-08-09 RX ADMIN — LORAZEPAM 0.5 MG: 0.5 TABLET ORAL at 08:08

## 2025-08-09 RX ADMIN — NICOTINE 1 PATCH: 21 PATCH, EXTENDED RELEASE TRANSDERMAL at 08:08

## 2025-08-09 RX ADMIN — SERTRALINE HYDROCHLORIDE 50 MG: 50 TABLET ORAL at 08:08

## 2025-08-09 RX ADMIN — OLANZAPINE 10 MG: 10 TABLET, FILM COATED ORAL at 08:08

## 2025-08-10 PROCEDURE — 11400000 HC PSYCH PRIVATE ROOM

## 2025-08-10 PROCEDURE — S4991 NICOTINE PATCH NONLEGEND: HCPCS | Performed by: PSYCHIATRY & NEUROLOGY

## 2025-08-10 PROCEDURE — 25000003 PHARM REV CODE 250: Performed by: PSYCHIATRY & NEUROLOGY

## 2025-08-10 RX ORDER — OLANZAPINE 10 MG/1
10 TABLET, FILM COATED ORAL NIGHTLY
Qty: 30 TABLET | Refills: 11 | Status: SHIPPED | OUTPATIENT
Start: 2025-08-11 | End: 2026-08-11

## 2025-08-10 RX ORDER — SERTRALINE HYDROCHLORIDE 50 MG/1
50 TABLET, FILM COATED ORAL DAILY
Qty: 30 TABLET | Refills: 11 | Status: SHIPPED | OUTPATIENT
Start: 2025-08-11 | End: 2026-08-11

## 2025-08-10 RX ADMIN — LORAZEPAM 0.5 MG: 0.5 TABLET ORAL at 08:08

## 2025-08-10 RX ADMIN — NICOTINE 1 PATCH: 21 PATCH, EXTENDED RELEASE TRANSDERMAL at 08:08

## 2025-08-10 RX ADMIN — SERTRALINE HYDROCHLORIDE 50 MG: 50 TABLET ORAL at 08:08

## 2025-08-10 RX ADMIN — OLANZAPINE 10 MG: 10 TABLET, FILM COATED ORAL at 08:08

## 2025-08-11 VITALS
WEIGHT: 155.44 LBS | BODY MASS INDEX: 20.6 KG/M2 | DIASTOLIC BLOOD PRESSURE: 72 MMHG | SYSTOLIC BLOOD PRESSURE: 118 MMHG | TEMPERATURE: 98 F | HEIGHT: 73 IN | RESPIRATION RATE: 18 BRPM | HEART RATE: 72 BPM | OXYGEN SATURATION: 97 %

## 2025-08-11 PROCEDURE — 25000003 PHARM REV CODE 250: Performed by: PSYCHIATRY & NEUROLOGY

## 2025-08-11 RX ORDER — OLANZAPINE 10 MG/1
10 TABLET, ORALLY DISINTEGRATING ORAL EVERY 8 HOURS PRN
Status: DISCONTINUED | OUTPATIENT
Start: 2025-08-11 | End: 2025-08-11 | Stop reason: HOSPADM

## 2025-08-11 RX ORDER — HYDROXYZINE PAMOATE 25 MG/1
50 CAPSULE ORAL EVERY 6 HOURS PRN
Status: DISCONTINUED | OUTPATIENT
Start: 2025-08-11 | End: 2025-08-11 | Stop reason: HOSPADM

## 2025-08-11 RX ADMIN — SERTRALINE HYDROCHLORIDE 50 MG: 50 TABLET ORAL at 08:08
